# Patient Record
(demographics unavailable — no encounter records)

---

## 2025-03-03 NOTE — HISTORY OF PRESENT ILLNESS
[de-identified] : This is a 69 yo male patient of Dr. Rainey with recent reports of having difficulties swallowing solid foods for the past 2 months with associated 10 lbs unintentional weight loss.  He states he feels like he has been swallowing a lot of air and it feels like there is difficulties with food reaching his stomach. EGD performed on 2/26 showed malignant appearing friable mass at the GE junction and fundus that was actively bleeding/oozing.  Therefore, pt was sent to the ED for close hemodynamic monitoring.   While In the ED, CT C/A/P shows a Large distal esophageal mass with upper abdominal adenopathy. Diffuse gastric rugal thickening and enhancement. No evidence of liver metastases. Mild ascites. During hospitalization, CBC remained stable.     CT CAP read: IMPRESSION: Large distal esophageal mass with upper abdominal adenopathy. Diffuse gastric rugal thickening and enhancement. No evidence of liver metastases. Mild ascites.   No path results as of 3/3/25  Past hx: HLD, gout, prediabetes  Health screenings: Colonoscopy Prostate  Past surgical history:  right osteoma surgery  Social History: Former smoker Alcohol - bottle of wine daily and does and consume a sig amount of smoked foods - bbq, lox. No Drugs;  Employment - finance,  Family History:  Other - grandmother with lung cancer Meds- rosuvastatin 10 mg daily Allergies:  No Known Allergies